# Patient Record
Sex: MALE | Race: WHITE | NOT HISPANIC OR LATINO | Employment: OTHER | URBAN - METROPOLITAN AREA
[De-identification: names, ages, dates, MRNs, and addresses within clinical notes are randomized per-mention and may not be internally consistent; named-entity substitution may affect disease eponyms.]

---

## 2021-08-18 ENCOUNTER — HOSPITAL ENCOUNTER (EMERGENCY)
Facility: HOSPITAL | Age: 46
Discharge: HOME | End: 2021-08-18
Attending: EMERGENCY MEDICINE
Payer: COMMERCIAL

## 2021-08-18 ENCOUNTER — APPOINTMENT (EMERGENCY)
Dept: RADIOLOGY | Facility: HOSPITAL | Age: 46
End: 2021-08-18
Payer: COMMERCIAL

## 2021-08-18 VITALS
OXYGEN SATURATION: 98 % | DIASTOLIC BLOOD PRESSURE: 80 MMHG | WEIGHT: 206.79 LBS | TEMPERATURE: 98.3 F | BODY MASS INDEX: 27.41 KG/M2 | HEART RATE: 78 BPM | HEIGHT: 73 IN | SYSTOLIC BLOOD PRESSURE: 123 MMHG | RESPIRATION RATE: 18 BRPM

## 2021-08-18 DIAGNOSIS — T14.8XXA BRUISE: ICD-10-CM

## 2021-08-18 DIAGNOSIS — R58 ECCHYMOSIS: Primary | ICD-10-CM

## 2021-08-18 DIAGNOSIS — S89.90XA LEG INJURY: ICD-10-CM

## 2021-08-18 PROCEDURE — 99282 EMERGENCY DEPT VISIT SF MDM: CPT | Performed by: EMERGENCY MEDICINE

## 2021-08-18 PROCEDURE — 99283 EMERGENCY DEPT VISIT LOW MDM: CPT

## 2021-08-18 PROCEDURE — 73590 X-RAY EXAM OF LOWER LEG: CPT

## 2021-08-18 NOTE — ED PROVIDER NOTES
History  Chief Complaint   Patient presents with    Leg Pain     Pt states that he was struck in the left calf by his daughters foot while on the swings, however his left foot is red, cold to the touch, and has limited range of motion  42year old male patient presents emergency department for evaluation of bruising his left foot  Patient states he was on a swing his daughter was on the swing in the both collided  Patient's daughter impacted him in the patient had some pain with intermittent lumps  Since that time the patient has developed ecchymosis of the foot consistent with blood settling to its lowest point  An x-ray will be done assure that there is no fracture and if that is obvious and present on my review of the x-ray will be discharged home with instructions to utilize warm compresses in attempts to help reabsorb the blood  History provided by:  Patient   used: No    Leg Pain  Location:  Foot  Foot location:  L foot  Pain details:     Quality:  Aching  Chronicity:  New  Associated symptoms: no numbness    Risk factors: no concern for non-accidental trauma, no frequent fractures, no obesity and no recent illness        None       Past Medical History:   Diagnosis Date    Cancer of kidney ([facility] [address] ) 2016    Problem resolved with surgery       History reviewed  No pertinent surgical history  History reviewed  No pertinent family history  I have reviewed and agree with the history as documented  E-Cigarette/Vaping     E-Cigarette/Vaping Substances     Social History     Tobacco Use    Smoking status: Current Every Day Smoker     Packs/day: 0 50     Types: Cigarettes    Smokeless tobacco: Never Used   Substance Use Topics    Alcohol use: Yes     Comment: occasionally    Drug use: Never       Review of Systems   All other systems reviewed and are negative  Physical Exam  Physical Exam  Vitals and nursing note reviewed     Constitutional:       General: He is not in acute distress  Appearance: He is well-developed  He is not diaphoretic  HENT:      Head: Normocephalic and atraumatic  Right Ear: External ear normal       Left Ear: External ear normal    Eyes:      General: No scleral icterus  Right eye: No discharge  Left eye: No discharge  Conjunctiva/sclera: Conjunctivae normal    Neck:      Thyroid: No thyromegaly  Vascular: No JVD  Trachea: No tracheal deviation  Cardiovascular:      Rate and Rhythm: Normal rate and regular rhythm  Pulmonary:      Effort: Pulmonary effort is normal  No respiratory distress  Breath sounds: Normal breath sounds  No stridor  No wheezing or rales  Abdominal:      General: Bowel sounds are normal  There is no distension  Palpations: Abdomen is soft  Tenderness: There is no abdominal tenderness  Musculoskeletal:         General: No tenderness or deformity  Normal range of motion  Cervical back: Normal range of motion and neck supple  Skin:     General: Skin is warm and dry  Neurological:      Mental Status: He is alert and oriented to person, place, and time  Cranial Nerves: No cranial nerve deficit        Coordination: Coordination normal    Psychiatric:         Behavior: Behavior normal          Vital Signs  ED Triage Vitals [08/18/21 1912]   Temperature Pulse Respirations Blood Pressure SpO2   98 3 °F (36 8 °C) 78 18 123/80 98 %      Temp Source Heart Rate Source Patient Position - Orthostatic VS BP Location FiO2 (%)   Oral Monitor Sitting Left arm --      Pain Score       --           Vitals:    08/18/21 1912   BP: 123/80   Pulse: 78   Patient Position - Orthostatic VS: Sitting         Visual Acuity      ED Medications  Medications - No data to display    Diagnostic Studies  Results Reviewed     None                 XR tibia fibula 2 views LEFT    (Results Pending)              Procedures  Procedures         ED Course                             SBIRT 20yo+      Most Recent Value   SBIRT (22 yo +)   In order to provide better care to our patients, we are screening all of our patients for alcohol and drug use  Would it be okay to ask you these screening questions? Yes Filed at: 08/18/2021 1932   Initial Alcohol Screen: US AUDIT-C    1  How often do you have a drink containing alcohol?  0 Filed at: 08/18/2021 1932   2  How many drinks containing alcohol do you have on a typical day you are drinking? 0 Filed at: 08/18/2021 1932   3a  Male UNDER 65: How often do you have five or more drinks on one occasion? 0 Filed at: 08/18/2021 1932   3b  FEMALE Any Age, or MALE 65+: How often do you have 4 or more drinks on one occassion? 0 Filed at: 08/18/2021 1932   Audit-C Score  0 Filed at: 08/18/2021 1932   ELVIRA: How many times in the past year have you    Used an illegal drug or used a prescription medication for non-medical reasons? Never Filed at: 08/18/2021 1932                    MDM  Number of Diagnoses or Management Options  Bruise: new and requires workup  Ecchymosis: new and requires workup  Leg injury: new and requires workup     Amount and/or Complexity of Data Reviewed  Tests in the radiology section of CPT®: ordered and reviewed  Decide to obtain previous medical records or to obtain history from someone other than the patient: yes  Review and summarize past medical records: yes    Patient Progress  Patient progress: stable      Disposition  Final diagnoses:   Ecchymosis   Leg injury   Bruise     Time reflects when diagnosis was documented in both MDM as applicable and the Disposition within this note     Time User Action Codes Description Comment    8/18/2021  8:18 PM Lana Laughter Add [R58] Ecchymosis     8/18/2021  8:18 PM Lana Laughter Add [S89 90XA] Leg injury     8/18/2021  8:18 PM [place] Al[place], [address]  [address] [facility]       ED Disposition     ED Disposition Condition Date/Time Comment    Discharge Stable Wed Aug 18, 2021  8:18  [facility] [address] discharge to home/self care  Follow-up Information    None         There are no discharge medications for this patient  No discharge procedures on file      PDMP Review     None          ED Provider  Electronically Signed by           Fabiola Mello DO  08/18/21 1425